# Patient Record
Sex: MALE | Race: BLACK OR AFRICAN AMERICAN | NOT HISPANIC OR LATINO | Employment: UNEMPLOYED | URBAN - METROPOLITAN AREA
[De-identification: names, ages, dates, MRNs, and addresses within clinical notes are randomized per-mention and may not be internally consistent; named-entity substitution may affect disease eponyms.]

---

## 2019-09-28 ENCOUNTER — HOSPITAL ENCOUNTER (EMERGENCY)
Facility: HOSPITAL | Age: 29
Discharge: HOME | End: 2019-09-28
Attending: EMERGENCY MEDICINE

## 2019-09-28 VITALS
HEIGHT: 71 IN | OXYGEN SATURATION: 98 % | TEMPERATURE: 99 F | SYSTOLIC BLOOD PRESSURE: 137 MMHG | RESPIRATION RATE: 18 BRPM | HEART RATE: 101 BPM | DIASTOLIC BLOOD PRESSURE: 81 MMHG | BODY MASS INDEX: 35 KG/M2 | WEIGHT: 250 LBS

## 2019-09-28 DIAGNOSIS — R05.9 COUGH: ICD-10-CM

## 2019-09-28 DIAGNOSIS — B34.9 VIRAL SYNDROME: ICD-10-CM

## 2019-09-28 DIAGNOSIS — N39.0 URINARY TRACT INFECTION WITHOUT HEMATURIA, SITE UNSPECIFIED: Primary | ICD-10-CM

## 2019-09-28 LAB
BACTERIA #/AREA URNS HPF: ABNORMAL /HPF
BILIRUB UR QL STRIP: NEGATIVE
CLARITY UR: ABNORMAL
COLOR UR: YELLOW
CTP QC/QA: YES
DEPRECATED S PYO AG THROAT QL EIA: NEGATIVE
GLUCOSE UR QL STRIP: NEGATIVE
HGB UR QL STRIP: NEGATIVE
HYALINE CASTS #/AREA URNS LPF: 0 /LPF
KETONES UR QL STRIP: NEGATIVE
LEUKOCYTE ESTERASE UR QL STRIP: ABNORMAL
MICROSCOPIC COMMENT: ABNORMAL
NITRITE UR QL STRIP: NEGATIVE
PH UR STRIP: 6 [PH] (ref 5–8)
POC MOLECULAR INFLUENZA A AGN: NEGATIVE
POC MOLECULAR INFLUENZA B AGN: NEGATIVE
PROT UR QL STRIP: ABNORMAL
RBC #/AREA URNS HPF: 9 /HPF (ref 0–4)
SP GR UR STRIP: 1.02 (ref 1–1.03)
SQUAMOUS #/AREA URNS HPF: 2 /HPF
URN SPEC COLLECT METH UR: ABNORMAL
UROBILINOGEN UR STRIP-ACNC: NEGATIVE EU/DL
WBC #/AREA URNS HPF: >100 /HPF (ref 0–5)

## 2019-09-28 PROCEDURE — 25000003 PHARM REV CODE 250: Performed by: PHYSICIAN ASSISTANT

## 2019-09-28 PROCEDURE — 25000003 PHARM REV CODE 250: Performed by: EMERGENCY MEDICINE

## 2019-09-28 PROCEDURE — 63600175 PHARM REV CODE 636 W HCPCS: Performed by: PHYSICIAN ASSISTANT

## 2019-09-28 PROCEDURE — 93005 ELECTROCARDIOGRAM TRACING: CPT

## 2019-09-28 PROCEDURE — 87086 URINE CULTURE/COLONY COUNT: CPT

## 2019-09-28 PROCEDURE — 93010 EKG 12-LEAD: ICD-10-PCS | Mod: ,,, | Performed by: INTERNAL MEDICINE

## 2019-09-28 PROCEDURE — 87491 CHLMYD TRACH DNA AMP PROBE: CPT

## 2019-09-28 PROCEDURE — 81000 URINALYSIS NONAUTO W/SCOPE: CPT

## 2019-09-28 PROCEDURE — 93010 ELECTROCARDIOGRAM REPORT: CPT | Mod: ,,, | Performed by: INTERNAL MEDICINE

## 2019-09-28 PROCEDURE — 96372 THER/PROPH/DIAG INJ SC/IM: CPT

## 2019-09-28 PROCEDURE — 87081 CULTURE SCREEN ONLY: CPT

## 2019-09-28 PROCEDURE — 86703 HIV-1/HIV-2 1 RESULT ANTBDY: CPT

## 2019-09-28 PROCEDURE — 87880 STREP A ASSAY W/OPTIC: CPT

## 2019-09-28 PROCEDURE — 99284 EMERGENCY DEPT VISIT MOD MDM: CPT | Mod: 25

## 2019-09-28 RX ORDER — MONTELUKAST SODIUM 10 MG/1
10 TABLET ORAL NIGHTLY
COMMUNITY

## 2019-09-28 RX ORDER — CEFTRIAXONE 250 MG/1
250 INJECTION, POWDER, FOR SOLUTION INTRAMUSCULAR; INTRAVENOUS
Status: COMPLETED | OUTPATIENT
Start: 2019-09-28 | End: 2019-09-28

## 2019-09-28 RX ORDER — SULFAMETHOXAZOLE AND TRIMETHOPRIM 800; 160 MG/1; MG/1
1 TABLET ORAL 2 TIMES DAILY
Qty: 14 TABLET | Refills: 0 | Status: SHIPPED | OUTPATIENT
Start: 2019-09-28 | End: 2019-10-05

## 2019-09-28 RX ORDER — ACETAMINOPHEN 500 MG
1000 TABLET ORAL
Status: COMPLETED | OUTPATIENT
Start: 2019-09-28 | End: 2019-09-28

## 2019-09-28 RX ORDER — AZITHROMYCIN 250 MG/1
1000 TABLET, FILM COATED ORAL
Status: COMPLETED | OUTPATIENT
Start: 2019-09-28 | End: 2019-09-28

## 2019-09-28 RX ORDER — LEVALBUTEROL INHALATION SOLUTION 1.25 MG/3ML
1 SOLUTION RESPIRATORY (INHALATION) EVERY 4 HOURS PRN
COMMUNITY

## 2019-09-28 RX ADMIN — AZITHROMYCIN 1000 MG: 250 TABLET, FILM COATED ORAL at 08:09

## 2019-09-28 RX ADMIN — ACETAMINOPHEN 1000 MG: 500 TABLET ORAL at 07:09

## 2019-09-28 RX ADMIN — CEFTRIAXONE SODIUM 250 MG: 250 INJECTION, POWDER, FOR SOLUTION INTRAMUSCULAR; INTRAVENOUS at 08:09

## 2019-09-28 NOTE — ED TRIAGE NOTES
Started two days ago. Now having coughing difficulty breathing and pain when breathing. Positive for mild fever.

## 2019-09-28 NOTE — ED PROVIDER NOTES
Encounter Date: 9/28/2019    SCRIBE #1 NOTE: I, Mario Chairez, am scribing for, and in the presence of,  Marcelo Hummel PA-C. I have scribed the following portions of the note - Other sections scribed: HPI, ROS.       History     Chief Complaint   Patient presents with    Cough     Pt reports chest pain upon coughing. Pt has associated congestion with no relief from OTC medications.      CC: Cough    HPI: This 29 y.o. Male with asthma presents to the ED with multiple complainet. C/o cough, congestion and sore throat for the past few days. Pt endorses mild bilateral chest pain that started today with coughing and deep breaths. Also reports N/V that started today.Pt's last emesis had streaks of blood. He had 4 episodes of vomiting in the past hour. Pt reports increased urinary frequency for the past few days but denies any discharge or dysuria. Pt is requesting to be evaluated for STDs. He complies with Lexapro for anxiety. Pt attempted Singulair with slight relief.     The history is provided by the patient. No  was used.     Review of patient's allergies indicates:  No Known Allergies  Past Medical History:   Diagnosis Date    Asthma      No past surgical history on file.  No family history on file.  Social History     Tobacco Use    Smoking status: Never Smoker    Smokeless tobacco: Never Used   Substance Use Topics    Alcohol use: Yes     Alcohol/week: 1.0 standard drinks     Types: 1 Cans of beer per week     Comment: 1 drink daily    Drug use: Never     Review of Systems   Constitutional: Negative for chills and fever.   HENT: Positive for congestion and sore throat. Negative for rhinorrhea.    Eyes: Negative for pain.   Respiratory: Positive for cough. Negative for shortness of breath.    Cardiovascular: Positive for chest pain.   Gastrointestinal: Positive for nausea. Negative for abdominal pain, diarrhea and vomiting.   Genitourinary: Positive for frequency. Negative for dysuria and  hematuria.   Musculoskeletal: Positive for back pain. Negative for neck pain.   Skin: Negative for rash.   Allergic/Immunologic: Negative for immunocompromised state.   Neurological: Negative for dizziness, syncope and light-headedness.   Hematological: Does not bruise/bleed easily.   Psychiatric/Behavioral: Negative for confusion. The patient is not nervous/anxious.        Physical Exam     Initial Vitals [09/28/19 1805]   BP Pulse Resp Temp SpO2   (!) 157/81 (!) 123 18 (!) 101.3 °F (38.5 °C) 97 %      MAP       --         Physical Exam    Constitutional: He appears well-developed and well-nourished. He is cooperative.  Non-toxic appearance. No distress.   HENT:   Head: Normocephalic and atraumatic.   Right Ear: Hearing, tympanic membrane, external ear and ear canal normal.   Left Ear: Hearing, tympanic membrane, external ear and ear canal normal.   Nose: Rhinorrhea present.   Mouth/Throat: Uvula is midline and mucous membranes are normal. Posterior oropharyngeal erythema present. No oropharyngeal exudate or tonsillar abscesses.   Eyes: EOM are normal.   Neck: Normal range of motion. Neck supple.   Cardiovascular: Normal rate and intact distal pulses. Exam reveals no gallop and no friction rub.    No murmur heard.  Pulses:       Radial pulses are 2+ on the right side, and 2+ on the left side.   Pulmonary/Chest: Effort normal. No respiratory distress. He has no decreased breath sounds. He has no wheezes. He has no rhonchi. He has no rales.   Abdominal: Soft. Bowel sounds are normal. He exhibits no distension. There is no tenderness. There is no rigidity, no rebound, no guarding, no CVA tenderness, no tenderness at McBurney's point and negative Machado's sign.   Musculoskeletal: Normal range of motion.   Neurological: He is alert and oriented to person, place, and time. He has normal strength. No cranial nerve deficit or sensory deficit. Coordination and gait normal.   Skin: Skin is warm and dry. No rash noted.    Psychiatric: He has a normal mood and affect. His speech is normal and behavior is normal.         ED Course   Procedures  Labs Reviewed   C. TRACHOMATIS/N. GONORRHOEAE BY AMP DNA - Abnormal; Notable for the following components:       Result Value    Chlamydia, Amplified DNA Detected (*)     N gonorrhoeae, amplified DNA Detected (*)     All other components within normal limits    Narrative:     Sources by Resulting Lab:->[facility]   CULTURE, STREP A,  THROAT - Abnormal; Notable for the following components:    Strep A Culture   (*)     Value: STREPTOCOCCUS GROUP G  Many  Beta-hemolytic streptococci are routinely susceptible to   penicillins,cephalosporins and carbapenems.      All other components within normal limits   URINALYSIS, REFLEX TO URINE CULTURE - Abnormal; Notable for the following components:    Appearance, UA Cloudy (*)     Protein, UA 1+ (*)     Leukocytes, UA 3+ (*)     All other components within normal limits    Narrative:     Preferred Collection Type->Urine, Clean Catch   URINALYSIS MICROSCOPIC - Abnormal; Notable for the following components:    RBC, UA 9 (*)     WBC, UA >100 (*)     All other components within normal limits    Narrative:     Preferred Collection Type->Urine, Clean Catch   THROAT SCREEN, RAPID   CULTURE, URINE    Narrative:     Preferred Collection Type->Urine, Clean Catch   RAPID HIV   POCT INFLUENZA A/B MOLECULAR        ECG Results          EKG 12-lead (Final result)  Result time 09/29/19 19:52:36    Final result by Interface, Lab In [facility] (09/29/19 19:52:36)                 Narrative:    Test Reason : R05,    Vent. Rate : 113 BPM     Atrial Rate : 113 BPM     P-R Int : 158 ms          QRS Dur : 072 ms      QT Int : 286 ms       P-R-T Axes : 060 003 099 degrees     QTc Int : 392 ms    Sinus tachycardia  Minimal voltage criteria for LVH, may be normal variant  Nonspecific T wave abnormality  Abnormal ECG  No previous ECGs available  Confirmed by Carolynn MAYES, Vaishali RAMÍREZ ([id number]) on  9/29/2019 7:52:32 PM    Referred By: CHIQUI   SELF           Confirmed By:Vaishali Pradhan MD                            Imaging Results          X-Ray Chest PA And Lateral (Final result)  Result time 09/28/19 18:53:22    Final result by Gabriela Rodríguez MD (09/28/19 18:53:22)                 Impression:      Mild streaky opacity left mid lung favors platelike atelectasis.      Electronically signed by: Gabriela Rodríguez  Date:    09/28/2019  Time:    18:53             Narrative:    EXAMINATION:  XR CHEST PA AND LATERAL    CLINICAL HISTORY:  Cough    TECHNIQUE:  PA and lateral views of the chest were performed.    COMPARISON:  None    FINDINGS:  Frontal and lateral views presented.  There metallic adornment of the bilateral nipples.  There is mild streaky opacity at the left mid lung likely from platelike atelectasis.  No pneumothorax or pleural effusion or interstitial edema or lobar consolidation or nodule or cavitary lesion.  The hilar shadows and trachea appear normal.  Visualized bowel gas pattern and spine appear normal.                                 Medical Decision Making:   Clinical Tests:   Lab Tests: Ordered and Reviewed  Radiological Study: Ordered and Reviewed  ED Management:  Hemodynamically stable. Non-toxic and in no acute distress. Pt presenting with multiple complaints. UA with 3+ leuks and 100+ WBCs.  Rocephin and azithromycin given in ED. Will discharge on bactrim. Flu negative. Rapid strep negative. URI symptoms most likely viral. Discharge and f/u with PCP. Return instructions given. Pt verbalizes understanding and is agreeable with plan.             Scribe Attestation:   Scribe #1: I performed the above scribed service and the documentation accurately describes the services I performed. I attest to the accuracy of the note.      I, Marcelo Hummel, personally performed the services described in this documentation. All medical record entries made by the scribe were at my direction and in my  presence.  I have reviewed the chart and agree that the record reflects my personal performance and is accurate and complete.        Clinical Impression:       ICD-10-CM ICD-9-CM   1. Urinary tract infection without hematuria, site unspecified N39.0 599.0   2. Cough R05 786.2   3. Viral syndrome B34.9 079.99         Disposition:   Disposition: Discharged  Condition: Stable                        Marcelo Hummel PA-C  09/30/19 [id number]

## 2019-09-29 LAB
BACTERIA THROAT CULT: ABNORMAL
BACTERIA THROAT CULT: ABNORMAL
HIV1+2 IGG SERPL QL IA.RAPID: NEGATIVE

## 2019-09-29 NOTE — DISCHARGE INSTRUCTIONS
Please take new medications as directed. Please make sure to follow up with PCP using resources provided to discuss today's Emergency Department visit and for further evaluation and management. Please return to the Emergency Department if your symptoms worsen or you develop any additional concerning symptoms.

## 2019-09-30 LAB
BACTERIA UR CULT: NORMAL
C TRACH DNA SPEC QL NAA+PROBE: DETECTED
N GONORRHOEA DNA SPEC QL NAA+PROBE: DETECTED